# Patient Record
Sex: MALE | URBAN - METROPOLITAN AREA
[De-identification: names, ages, dates, MRNs, and addresses within clinical notes are randomized per-mention and may not be internally consistent; named-entity substitution may affect disease eponyms.]

---

## 2022-04-27 ENCOUNTER — APPOINTMENT (OUTPATIENT)
Dept: URBAN - METROPOLITAN AREA CLINIC 187 | Age: 72
Setting detail: DERMATOLOGY
End: 2022-04-27

## 2022-04-27 VITALS — WEIGHT: 175 LBS | HEIGHT: 67 IN

## 2022-04-27 DIAGNOSIS — R23.3 SPONTANEOUS ECCHYMOSES: ICD-10-CM

## 2022-04-27 PROBLEM — L30.9 DERMATITIS, UNSPECIFIED: Status: ACTIVE | Noted: 2022-04-27

## 2022-04-27 PROCEDURE — 11104 PUNCH BX SKIN SINGLE LESION: CPT

## 2022-04-27 PROCEDURE — OTHER COUNSELING: OTHER

## 2022-04-27 PROCEDURE — OTHER BIOPSY BY PUNCH METHOD: OTHER

## 2022-04-27 PROCEDURE — OTHER MIPS QUALITY: OTHER

## 2022-04-27 PROCEDURE — OTHER ORDER TESTS: OTHER

## 2022-04-27 ASSESSMENT — LOCATION ZONE DERM: LOCATION ZONE: ARM

## 2022-04-27 ASSESSMENT — LOCATION DETAILED DESCRIPTION DERM
LOCATION DETAILED: RIGHT PROXIMAL DORSAL FOREARM
LOCATION DETAILED: LEFT PROXIMAL DORSAL FOREARM
LOCATION DETAILED: RIGHT PROXIMAL RADIAL DORSAL FOREARM

## 2022-04-27 ASSESSMENT — LOCATION SIMPLE DESCRIPTION DERM
LOCATION SIMPLE: RIGHT FOREARM
LOCATION SIMPLE: LEFT FOREARM

## 2022-04-27 NOTE — HPI: BODY LOCATION - ARM
How Severe Is Your Condition?: moderate
Additional History: Pt states bruising on bilateral forearms for about 1 year, states bruising lasts a long time, \\nPt does not use lotion or anything else on arms \\nWants arms checked only